# Patient Record
(demographics unavailable — no encounter records)

---

## 2025-05-16 NOTE — PHYSICAL EXAM
[Left] : left knee [NL (0)] : extension 0 degrees [5___] : hamstring 5[unfilled]/5 [Equivocal] : equivocal Vee [] : not mildly antalgic [TWNoteComboBox7] : flexion 120 degrees

## 2025-05-16 NOTE — ASSESSMENT
[FreeTextEntry1] : - We have discussed the nature of her diagnosis - Discussed the role of cortisone injection for acute pain or possible repeat HA injections for longer improvement she would like to hold off on injections at this time - Prescription for meloxicam is provided for as needed use  The patient's orthopaedic condition(s) warrants intermittent use of a prescription strength non-steroidal anti-inflammatory medication.  These medications are associated with risks including but not limited to gastrointestinal irritation, kidney damage, hypertension, and bleeding.  The patient understands and will take medications as prescribed.  The patient will stop the medication and consult a physician as needed if problems arise.

## 2025-05-16 NOTE — HISTORY OF PRESENT ILLNESS
[Dull/Aching] : dull/aching [Localized] : localized [Intermittent] : intermittent [de-identified] : 05/16/2025: Patient is here to follow up on left knee.  She has had episodic cortisone and HA injections in the past she does not think she needs any injections today.  She just notes medially based pain with increased demand episodically  8/29/24: new injury here for left foot/ankle pain. She has been experiencing swelling for the last two weeks that makes it difficult to walk when she has it. no recent injury. Was getting pain hip to knee with evelin horse in middle of the night, then developed the ankle issue [] : no